# Patient Record
Sex: MALE | Race: WHITE | Employment: OTHER | URBAN - METROPOLITAN AREA
[De-identification: names, ages, dates, MRNs, and addresses within clinical notes are randomized per-mention and may not be internally consistent; named-entity substitution may affect disease eponyms.]

---

## 2018-07-14 ENCOUNTER — HOSPITAL ENCOUNTER (INPATIENT)
Facility: CLINIC | Age: 53
LOS: 1 days | Discharge: HOME | DRG: 871 | End: 2018-07-16
Attending: EMERGENCY MEDICINE | Admitting: HOSPITALIST
Payer: OTHER GOVERNMENT

## 2018-07-14 ENCOUNTER — APPOINTMENT (OUTPATIENT)
Dept: GENERAL RADIOLOGY | Facility: CLINIC | Age: 53
DRG: 871 | End: 2018-07-14
Attending: EMERGENCY MEDICINE
Payer: OTHER GOVERNMENT

## 2018-07-14 DIAGNOSIS — F17.200 TOBACCO DEPENDENCE SYNDROME: ICD-10-CM

## 2018-07-14 DIAGNOSIS — J18.9 PNEUMONIA OF RIGHT LOWER LOBE DUE TO INFECTIOUS ORGANISM: Primary | ICD-10-CM

## 2018-07-14 LAB
ALBUMIN SERPL-MCNC: 3.3 G/DL (ref 3.4–5)
ALP SERPL-CCNC: 103 U/L (ref 40–150)
ALT SERPL W P-5'-P-CCNC: 53 U/L (ref 0–70)
ANION GAP SERPL CALCULATED.3IONS-SCNC: 10 MMOL/L (ref 3–14)
AST SERPL W P-5'-P-CCNC: 30 U/L (ref 0–45)
BASOPHILS # BLD AUTO: 0.1 10E9/L (ref 0–0.2)
BASOPHILS NFR BLD AUTO: 0.5 %
BILIRUB SERPL-MCNC: 0.3 MG/DL (ref 0.2–1.3)
BUN SERPL-MCNC: 24 MG/DL (ref 7–30)
CALCIUM SERPL-MCNC: 8.9 MG/DL (ref 8.5–10.1)
CHLORIDE SERPL-SCNC: 103 MMOL/L (ref 94–109)
CO2 SERPL-SCNC: 26 MMOL/L (ref 20–32)
CREAT SERPL-MCNC: 1.19 MG/DL (ref 0.66–1.25)
DIFFERENTIAL METHOD BLD: ABNORMAL
EOSINOPHIL # BLD AUTO: 0.2 10E9/L (ref 0–0.7)
EOSINOPHIL NFR BLD AUTO: 1.7 %
ERYTHROCYTE [DISTWIDTH] IN BLOOD BY AUTOMATED COUNT: 14.9 % (ref 10–15)
GFR SERPL CREATININE-BSD FRML MDRD: 63 ML/MIN/1.7M2
GLUCOSE SERPL-MCNC: 122 MG/DL (ref 70–99)
HCT VFR BLD AUTO: 43.5 % (ref 40–53)
HGB BLD-MCNC: 14.9 G/DL (ref 13.3–17.7)
IMM GRANULOCYTES # BLD: 0.2 10E9/L (ref 0–0.4)
IMM GRANULOCYTES NFR BLD: 1.5 %
LACTATE BLD-SCNC: 1.7 MMOL/L (ref 0.7–2)
LYMPHOCYTES # BLD AUTO: 1.4 10E9/L (ref 0.8–5.3)
LYMPHOCYTES NFR BLD AUTO: 11.8 %
MCH RBC QN AUTO: 30.4 PG (ref 26.5–33)
MCHC RBC AUTO-ENTMCNC: 34.3 G/DL (ref 31.5–36.5)
MCV RBC AUTO: 89 FL (ref 78–100)
MONOCYTES # BLD AUTO: 1.4 10E9/L (ref 0–1.3)
MONOCYTES NFR BLD AUTO: 12.3 %
NEUTROPHILS # BLD AUTO: 8.4 10E9/L (ref 1.6–8.3)
NEUTROPHILS NFR BLD AUTO: 72.2 %
NRBC # BLD AUTO: 0 10*3/UL
NRBC BLD AUTO-RTO: 0 /100
PLATELET # BLD AUTO: 339 10E9/L (ref 150–450)
POTASSIUM SERPL-SCNC: 3.9 MMOL/L (ref 3.4–5.3)
PROCALCITONIN SERPL-MCNC: 0.15 NG/ML
PROT SERPL-MCNC: 7.7 G/DL (ref 6.8–8.8)
RBC # BLD AUTO: 4.9 10E12/L (ref 4.4–5.9)
SODIUM SERPL-SCNC: 139 MMOL/L (ref 133–144)
WBC # BLD AUTO: 11.6 10E9/L (ref 4–11)

## 2018-07-14 PROCEDURE — 87040 BLOOD CULTURE FOR BACTERIA: CPT | Performed by: EMERGENCY MEDICINE

## 2018-07-14 PROCEDURE — 87800 DETECT AGNT MULT DNA DIREC: CPT | Performed by: EMERGENCY MEDICINE

## 2018-07-14 PROCEDURE — 96365 THER/PROPH/DIAG IV INF INIT: CPT

## 2018-07-14 PROCEDURE — 87186 SC STD MICRODIL/AGAR DIL: CPT | Performed by: EMERGENCY MEDICINE

## 2018-07-14 PROCEDURE — 25000128 H RX IP 250 OP 636: Performed by: EMERGENCY MEDICINE

## 2018-07-14 PROCEDURE — 84145 PROCALCITONIN (PCT): CPT | Performed by: EMERGENCY MEDICINE

## 2018-07-14 PROCEDURE — 80053 COMPREHEN METABOLIC PANEL: CPT | Performed by: EMERGENCY MEDICINE

## 2018-07-14 PROCEDURE — 25000132 ZZH RX MED GY IP 250 OP 250 PS 637: Performed by: EMERGENCY MEDICINE

## 2018-07-14 PROCEDURE — 83605 ASSAY OF LACTIC ACID: CPT | Performed by: EMERGENCY MEDICINE

## 2018-07-14 PROCEDURE — 85025 COMPLETE CBC W/AUTO DIFF WBC: CPT | Performed by: EMERGENCY MEDICINE

## 2018-07-14 PROCEDURE — 96367 TX/PROPH/DG ADDL SEQ IV INF: CPT

## 2018-07-14 PROCEDURE — 87077 CULTURE AEROBIC IDENTIFY: CPT | Performed by: EMERGENCY MEDICINE

## 2018-07-14 PROCEDURE — 71046 X-RAY EXAM CHEST 2 VIEWS: CPT

## 2018-07-14 PROCEDURE — 99285 EMERGENCY DEPT VISIT HI MDM: CPT | Mod: 25

## 2018-07-14 RX ORDER — LISINOPRIL AND HYDROCHLOROTHIAZIDE 12.5; 2 MG/1; MG/1
1 TABLET ORAL DAILY
COMMUNITY

## 2018-07-14 RX ORDER — IBUPROFEN 400 MG/1
400 TABLET, FILM COATED ORAL ONCE
Status: COMPLETED | OUTPATIENT
Start: 2018-07-14 | End: 2018-07-14

## 2018-07-14 RX ORDER — CEFTRIAXONE 2 G/1
2 INJECTION, POWDER, FOR SOLUTION INTRAMUSCULAR; INTRAVENOUS ONCE
Status: COMPLETED | OUTPATIENT
Start: 2018-07-14 | End: 2018-07-14

## 2018-07-14 RX ADMIN — AZITHROMYCIN MONOHYDRATE 500 MG: 500 INJECTION, POWDER, LYOPHILIZED, FOR SOLUTION INTRAVENOUS at 23:21

## 2018-07-14 RX ADMIN — CEFTRIAXONE SODIUM 2 G: 2 INJECTION, POWDER, FOR SOLUTION INTRAMUSCULAR; INTRAVENOUS at 22:50

## 2018-07-14 RX ADMIN — IBUPROFEN 400 MG: 400 TABLET ORAL at 22:06

## 2018-07-14 ASSESSMENT — ENCOUNTER SYMPTOMS
ABDOMINAL PAIN: 0
FEVER: 1
HEADACHES: 1
COUGH: 1
DIAPHORESIS: 1

## 2018-07-14 NOTE — IP AVS SNAPSHOT
MRN:[id number]                      After Visit Summary   7/14/2018    Babak Akbar    MRN: [id number]           Thank you!     Thank you for choosing [facility] for your care. Our goal is always to provide you with excellent care. Hearing back from our patients is one way we can continue to improve our services. Please take a few minutes to complete the written survey that you may receive in the mail after you visit with us. Thank you!        Patient Information     Date Of Birth          1965        Designated Caregiver       Most Recent Value    Caregiver    Will someone help with your care after discharge? yes    Name of designated caregiver Marlen Akbar    Phone number of caregiver [phone]    Caregiver [address] [place] [address]       About your hospital stay     You were admitted on:  July 15, 2018 You last received care in the:  [facility] [address] [facility]    You were discharged on:  July 16, 2018        Reason for your hospital stay       You were admitted for pneumonia of your right lower lobe.                  Who to Call     For medical emergencies, please call 911.  For non-urgent questions about your medical care, please call your primary care provider or clinic, [phone]          Attending Provider     Provider Specialty    Rob Duval MD Emergency Medicine    [facility]       Primary Care Provider Office Phone # Fax #    [facility] [phone]       When to contact your care team       Call your primary doctor if you have any of the following:  increased shortness of breath, persistent fever >101, or develop persistent, watery diarrhea or new abdominal pain within 2 weeks of completing your course of antibiotics.                  After Care Instructions     Activity       Your activity upon discharge: activity as tolerated            Diet       Follow this  "diet upon discharge: Regular Diet Adult                  Follow-up Appointments     Follow-up and recommended labs and tests        Follow up with new primary care provider, Dr Arana, on Monday 7/23/18 at 11:30am for hospital follow- up and to initiate primary care.  Please arrive 15 minutes early.  No follow up labs or test are needed.                  Your next 10 appointments already scheduled     Jul 23, 2018 11:30 AM CDT   [facility] MD   [facility]    [address] [facility] [address] [address]   [phone]           Bring a current list of meds and any records pertaining to this visit. For Physicals, please bring immunization records and any forms needing to be filled out. Please arrive 10 minutes early to complete paperwork.              Pending Results     Date and Time Order Name Status Description    7/15/2018 0057 Sputum Culture Aerobic Bacterial In process     7/14/2018 2159 Blood culture Preliminary     7/14/2018 2159 Blood culture Preliminary             Statement of Approval     Ordered          07/16/18 1051  I have reviewed and agree with all the recommendations and orders detailed in this document.  EFFECTIVE NOW     Approved and electronically signed by:  Javier Castellanos MD             Admission Information     Date & Time Provider Department Dept. Phone    7/14/2018 Sean Bhatti,  [facility] [address] [facility] [phone]      Your Vitals Were     Blood Pressure Pulse Temperature Respirations Height Weight    146/101 (BP Location: Right arm) 78 97.5  F (36.4  C) (Oral) 18 1.88 m (6' 2\") 106.8 kg (235 lb 7.2 oz)    Pulse Oximetry BMI (Body Mass Index)                94% 30.23 kg/m2          MyChart Information     [employer] lets you send messages to your doctor, view your test results, renew your prescriptions, schedule appointments and more. To sign up, go to [email or web address] . Click " "on \"Log in\" on the left side of the screen, which will take you to the Welcome page. Then click on \"Sign up Now\" on the right side of the page.     You will be asked to enter the access code listed below, as well as some personal information. Please follow the directions to create your username and password.     Your access code is: [email or web address]  Expires: 10/14/2018 11:01 AM     Your access code will  in 90 days. If you need help or a new code, please call your [facility] clinic or [phone].        Care EveryWhere ID     This is your Care EveryWhere ID. This could be used by other organizations to access your [facility] medical records  [id number]        Equal Access to Services     REECE TORRE : Marianne Resendiz, waluther hasa, breanna kaalfuad palma, wes watts. So [facility] [phone].    ATENCIÓN: Si habla español, tiene a perez disposición servicios gratuitos de asistencia lingüística. Llame al [phone].    We comply with applicable federal civil rights laws and [place] laws. We do not discriminate on the basis of race, color, national origin, age, disability, sex, sexual orientation, or gender identity.               Review of your medicines      START taking        Dose / Directions    albuterol 108 (90 Base) MCG/ACT Inhaler   Commonly known as:  PROAIR HFA/PROVENTIL HFA/VENTOLIN HFA   Used for:  Pneumonia of right lower lobe due to infectious organism (H)        Dose:  2 puff   Inhale 2 puffs into the lungs every 6 hours as needed for shortness of breath / dyspnea or wheezing   Quantity:  1 Inhaler   Refills:  0       azithromycin 250 MG tablet   Commonly known as:  ZITHROMAX   Used for:  Pneumonia of right lower lobe due to infectious organism (H)        Dose:  250 mg   Start taking on:  2018   Take 1 tablet (250 mg) by mouth daily for 3 days   Quantity:  3 tablet   Refills:  0       cefuroxime 500 MG tablet   Commonly known as:  CEFTIN   Used " for:  Pneumonia of right lower lobe due to infectious organism (H)        Dose:  500 mg   Start taking on:  7/17/2018   Take 1 tablet (500 mg) by mouth 2 times daily for 5 days   Quantity:  10 tablet   Refills:  0       nicotine 14 MG/24HR 24 hr patch   Commonly known as:  NICODERM CQ   Used for:  Tobacco dependence syndrome        Dose:  1 patch   Place 1 patch onto the skin daily   Quantity:  21 patch   Refills:  0         CONTINUE these medicines which have NOT CHANGED        Dose / Directions    ATORVASTATIN CALCIUM PO        Dose:  40 mg   Take 40 mg by mouth   Refills:  0       lisinopril-hydrochlorothiazide 20-12.5 MG per tablet   Commonly known as:  PRINZIDE/ZESTORETIC        Dose:  1 tablet   Take 1 tablet by mouth daily   Refills:  0       TYLENOL PO        Dose:  500 mg   Take 500 mg by mouth   Refills:  0            Where to get your medicines      These medications were sent to [facility] - [address]  [address], [place] [address]     Phone:  [phone]     albuterol 108 (90 Base) MCG/ACT Inhaler    azithromycin 250 MG tablet    cefuroxime 500 MG tablet    nicotine 14 MG/24HR 24 hr patch                Protect others around you: Learn how to safely use, store and throw away your medicines at www.disposemymeds.org.        ANTIBIOTIC INSTRUCTION     You've Been Prescribed an Antibiotic - Now What?  Your healthcare team thinks that you or your loved one might have an infection. Some infections can be treated with antibiotics, which are powerful, life-saving drugs. Like all medications, antibiotics have side effects and should only be used when necessary. There are some important things you should know about your antibiotic treatment.      Your healthcare team may run tests before you start taking an antibiotic.    Your team may take samples (e.g., from your blood, urine or other areas) to run tests to look for bacteria. These test can be important to  determine if you need an antibiotic at all and, if you do, which antibiotic will work best.      Within a few days, your healthcare team might change or even stop your antibiotic.    Your team may start you on an antibiotic while they are working to find out what is making you sick.    Your team might change your antibiotic because test results show that a different antibiotic would be better to treat your infection.    In some cases, once your team has more information, they learn that you do not need an antibiotic at all. They may find out that you don't have an infection, or that the antibiotic you're taking won't work against your infection. For example, an infection caused by a virus can't be treated with antibiotics. Staying on an antibiotic when you don't need it is more likely to be harmful than helpful.      You may experience side effects from your antibiotic.    Like all medications, antibiotics have side effects. Some of these can be serious.    Let you healthcare team know if you have any known allergies when you are admitted to the hospital.    One significant side effect of nearly all antibiotics is the risk of severe and sometimes deadly diarrhea caused by Clostridium difficile (C. Difficile). This occurs when a person takes antibiotics because some good germs are destroyed. Antibiotic use allows C. diificile to take over, putting patients at high risk for this serious infection.    As a patient or caregiver, it is important to understand your or your loved one's antibiotic treatment. It is especially important for caregivers to speak up when patients can't speak for themselves. Here are some important questions to ask your healthcare team.    What infection is this antibiotic treating and how do you know I have that infection?    What side effects might occur from this antibiotic?    How long will I need to take this antibiotic?    Is it safe to take this antibiotic with other medications or  supplements (e.g., vitamins) that I am taking?     Are there any special directions I need to know about taking this antibiotic? For example, should I take it with food?    How will I be monitored to know whether my infection is responding to the antibiotic?    What tests may help to make sure the right antibiotic is prescribed for me?      Information provided by:  www.cdc.gov/getsmart  U.S. Department of Health and Human Services  Centers for disease Control and Prevention  National Center for Emerging and Zoonotic Infectious Diseases  Division of Healthcare Quality Promotion             Medication List: This is a list of all your medications and when to take them. Check marks below indicate your daily home schedule. Keep this list as a reference.      Medications           Morning Afternoon Evening Bedtime As Needed    albuterol 108 (90 Base) MCG/ACT Inhaler   Commonly known as:  PROAIR HFA/PROVENTIL HFA/VENTOLIN HFA   Inhale 2 puffs into the lungs every 6 hours as needed for shortness of breath / dyspnea or wheezing                                ATORVASTATIN CALCIUM PO   Take 40 mg by mouth   Last time this was given:  40 mg on 7/16/2018  7:54 AM                                azithromycin 250 MG tablet   Commonly known as:  ZITHROMAX   Take 1 tablet (250 mg) by mouth daily for 3 days   Start taking on:  7/17/2018                                cefuroxime 500 MG tablet   Commonly known as:  CEFTIN   Take 1 tablet (500 mg) by mouth 2 times daily for 5 days   Start taking on:  7/17/2018                                lisinopril-hydrochlorothiazide 20-12.5 MG per tablet   Commonly known as:  PRINZIDE/ZESTORETIC   Take 1 tablet by mouth daily   Last time this was given:  1 tablet on 7/16/2018  7:55 AM                                nicotine 14 MG/24HR 24 hr patch   Commonly known as:  NICODERM CQ   Place 1 patch onto the skin daily   Last time this was given:  1 patch on 7/16/2018  7:55 AM                                 TYLENOL PO   Take 500 mg by mouth

## 2018-07-14 NOTE — IP AVS SNAPSHOT
[facility] [address] [facility]    [address] [place] [address]    Phone:  [phone]                                       After Visit Summary   7/14/2018    Babak Akbar    MRN: [id number]           After Visit Summary Signature Page     I have received my discharge instructions, and my questions have been answered. I have discussed any challenges I see with this plan with the nurse or doctor.    ..........................................................................................................................................  Patient/Patient Representative Signature      ..........................................................................................................................................  Patient Representative Print Name and Relationship to Patient    ..................................................               ................................................  Date                                            Time    ..........................................................................................................................................  Reviewed by Signature/Title    ...................................................              ..............................................  Date                                                            Time

## 2018-07-15 PROBLEM — J18.9 COMMUNITY ACQUIRED PNEUMONIA: Status: ACTIVE | Noted: 2018-07-15

## 2018-07-15 LAB
GRAM STN SPEC: NORMAL
Lab: NORMAL
SPECIMEN SOURCE: NORMAL

## 2018-07-15 PROCEDURE — 87205 SMEAR GRAM STAIN: CPT | Performed by: HOSPITALIST

## 2018-07-15 PROCEDURE — 25000132 ZZH RX MED GY IP 250 OP 250 PS 637: Performed by: HOSPITALIST

## 2018-07-15 PROCEDURE — 99222 1ST HOSP IP/OBS MODERATE 55: CPT | Mod: AI | Performed by: HOSPITALIST

## 2018-07-15 PROCEDURE — 99207 ZZC APP CREDIT; MD BILLING SHARED VISIT: CPT | Performed by: HOSPITALIST

## 2018-07-15 PROCEDURE — 12000000 ZZH R&B MED SURG/OB

## 2018-07-15 PROCEDURE — 87070 CULTURE OTHR SPECIMN AEROBIC: CPT | Performed by: HOSPITALIST

## 2018-07-15 PROCEDURE — 25000128 H RX IP 250 OP 636: Performed by: HOSPITALIST

## 2018-07-15 RX ORDER — ATORVASTATIN CALCIUM 40 MG/1
40 TABLET, FILM COATED ORAL DAILY
Status: DISCONTINUED | OUTPATIENT
Start: 2018-07-16 | End: 2018-07-16 | Stop reason: HOSPADM

## 2018-07-15 RX ORDER — CEFTRIAXONE 1 G/1
1 INJECTION, POWDER, FOR SOLUTION INTRAMUSCULAR; INTRAVENOUS EVERY 24 HOURS
Status: DISCONTINUED | OUTPATIENT
Start: 2018-07-16 | End: 2018-07-15

## 2018-07-15 RX ORDER — ACETAMINOPHEN 500 MG
500 TABLET ORAL EVERY 6 HOURS PRN
Status: DISCONTINUED | OUTPATIENT
Start: 2018-07-15 | End: 2018-07-16 | Stop reason: HOSPADM

## 2018-07-15 RX ORDER — HYDRALAZINE HYDROCHLORIDE 20 MG/ML
10 INJECTION INTRAMUSCULAR; INTRAVENOUS EVERY 4 HOURS PRN
Status: DISCONTINUED | OUTPATIENT
Start: 2018-07-15 | End: 2018-07-16 | Stop reason: HOSPADM

## 2018-07-15 RX ORDER — CEFTRIAXONE 1 G/1
1 INJECTION, POWDER, FOR SOLUTION INTRAMUSCULAR; INTRAVENOUS EVERY 24 HOURS
Status: DISCONTINUED | OUTPATIENT
Start: 2018-07-15 | End: 2018-07-16

## 2018-07-15 RX ORDER — NALOXONE HYDROCHLORIDE 0.4 MG/ML
.1-.4 INJECTION, SOLUTION INTRAMUSCULAR; INTRAVENOUS; SUBCUTANEOUS
Status: DISCONTINUED | OUTPATIENT
Start: 2018-07-15 | End: 2018-07-16 | Stop reason: HOSPADM

## 2018-07-15 RX ORDER — LISINOPRIL AND HYDROCHLOROTHIAZIDE 12.5; 2 MG/1; MG/1
1 TABLET ORAL DAILY
Status: DISCONTINUED | OUTPATIENT
Start: 2018-07-16 | End: 2018-07-16 | Stop reason: HOSPADM

## 2018-07-15 RX ORDER — SODIUM CHLORIDE 9 MG/ML
INJECTION, SOLUTION INTRAVENOUS CONTINUOUS
Status: DISCONTINUED | OUTPATIENT
Start: 2018-07-15 | End: 2018-07-15

## 2018-07-15 RX ORDER — NICOTINE 21 MG/24HR
1 PATCH, TRANSDERMAL 24 HOURS TRANSDERMAL DAILY
Status: DISCONTINUED | OUTPATIENT
Start: 2018-07-15 | End: 2018-07-16 | Stop reason: HOSPADM

## 2018-07-15 RX ADMIN — NICOTINE 1 PATCH: 14 PATCH, EXTENDED RELEASE TRANSDERMAL at 17:09

## 2018-07-15 RX ADMIN — NICOTINE 1 PATCH: 14 PATCH, EXTENDED RELEASE TRANSDERMAL at 08:32

## 2018-07-15 RX ADMIN — SODIUM CHLORIDE: 9 INJECTION, SOLUTION INTRAVENOUS at 01:19

## 2018-07-15 NOTE — PLAN OF CARE
Problem: Patient Care Overview  Goal: Plan of Care/Patient Progress Review  Outcome: No Change  Admission    Patient arrives to room 632-1 via cart from the ED.    A/Ox4, VSS ex HTN, on 2L NCO2, denies pain, SOB.  Pt states that he did not take PTA HTN meds today.  Unable to wean, goal is >94% sats.  Independent in room, calls appropriately.  IVF infusing.      Inpatient nursing criteria listed below were met:    PCD's Documented: Yes  Skin issues/needs documented :NA  Isolation education started/completed NA  Patient allergies verified with patient: Yes  Verified completion of [place] Risk Assessment Tool:  No  Verified completion of Guardianship screening tool: No  Fall Prevention: Care plan updated, Education given and documented Yes  Care Plan initiated: Yes  Home medications documented in belongings flowsheet: NA  Patient belongings documented in belongings flowsheet: Yes  Reminder note (belongings/ medications) placed in discharge instructions:NA  Admission profile/ required documentation complete: No, needs review of advanced directives.  Will report to oncoming RN.  Patient had asked to be left sleeping at last interaction.        Problem: Pneumonia (Adult)  Goal: Signs and Symptoms of Listed Potential Problems Will be Absent, Minimized or Managed (Pneumonia)  Signs and symptoms of listed potential problems will be absent, minimized or managed by discharge/transition of care (reference Pneumonia (Adult) CPG).   Outcome: No Change  LS are fine to coarse crackles throughout.  CXR shows right-sided infiltrates.  D/C 2-3 days pending clinical course and IV abx.  IS Given.  Awaiting sputum collection, container given.  Non-productive, infrequent cough overnight.      Problem: Cardiac Disease Comorbidity  Goal: Cardiac Disease  Patient comorbidity will be monitored for signs and symptoms of Cardiac Disease.  Problems will be absent, minimized or managed by discharge/transition of care.   Outcome: No Change  HTN:  PTA meds for management.    Problem: Peripheral Vascular/Peripheral Neurovascular Disease Comorbidity  Goal: Peripheral Vascular/Peripheral Neurovascular Disease  Patient comorbidity will be monitored for signs and symptoms of Peripheral Vascular/Peripheral Neurovascular Disease condition.  Problems will be absent, minimized or managed by discharge/transition of care.   Outcome: No Change  Frequent ambulation. Independent in room/halls.

## 2018-07-15 NOTE — PLAN OF CARE
Problem: Patient Care Overview  Goal: Plan of Care/Patient Progress Review  Outcome: Improving  Patient alert/orient X4, up independently in room, ambulating hallways throughout shift.  Lungs clear, on RA 92%, does get slight CASIANO.  Instructed patient on IS, tolerating regular diet/good fluid intake.  Temps 99.4/99.1, offered tylenol but pt refused, diastolic pressure slightly elevated.  Nicotine patch on left deltoid. Continue to monitor.

## 2018-07-15 NOTE — PROGRESS NOTES
RN [id number]:  Shira, temp 98.3.  Up ambulating in hallway independently.  Lungs clear, on RA, denies any pain.

## 2018-07-15 NOTE — ED PROVIDER NOTES
"  History     Chief Complaint:  Fever     HPI   Babak Akbar is a 53 year old male with a history of hypertension who presents to the emergency department today for evaluation of fever and cough. The patient reports he was station for 9 months in [place] and spend most of his time in [facility]. For two weeks prior to arrival back to [place], he was in the [facility] performing  exercises. He flew back to [place], [place] and returned to [place] today. 9 days ago, he began to have a fever. He was tested negative for strep. 5-4 days ago, he began to developed a productive cough. His fever had been improved but returned today, prompting him to present to the emergency department. He took tylenol around 2000 without significant improvement. He denies coughing blood, abdominal pain.     Allergies:  No Known Drug Allergies    Medications:    Lisinopril-Hydrochlorothiazide     Past Medical History:    Hypertension    Past Surgical History:    History reviewed. No pertinent surgical history.    Family History:    History reviewed. No pertinent family history.     Social History:  The patient was accompanied to the ED by his wife.  Smoking Status: current every day smoker  Smokeless Tobacco: never  Alcohol Use: no  Marital Status:      Review of Systems   Constitutional: Positive for diaphoresis and fever.   Respiratory: Positive for cough.    Gastrointestinal: Negative for abdominal pain.   Neurological: Positive for headaches.   All other systems reviewed and are negative.    Physical Exam   First Vitals: BP (!) 156/96  Temp 101.5  F (38.6  C) (Oral)  Resp 24  Ht 1.88 m (6' 2\")  Wt 104.3 kg (230 lb)  SpO2 90%  BMI 29.53 kg/m2    Physical Exam  GENERAL: well developed, pleasant  HEAD: atraumatic  EYES: pupils reactive, extraocular muscles intact, conjunctivae normal  ENT:  mucus membranes moist  NECK:  trachea midline, normal range of motion  RESPIRATORY:occasional coarse breath " sounds in the right base, no tachypneaCVS: normal S1/S2, no murmurs, intact distal pulses  ABDOMEN: soft, nontender, nondistention  MUSCULOSKELETAL: no deformities  SKIN: warm and dry, no acute rashes or ulceration  NEURO: GCS 15, cranial nerves intact, alert and oriented x3  PSYCH:  Mood/affect normal    Emergency Department Course     Imaging:  Radiology findings were communicated with the patient who voiced understanding of the findings.    XR Chest 2 Views   Final Result   IMPRESSION: Right lower lobe infiltrate concerning for pneumonia.   Small right pleural effusion. There may also be a small left   infiltrate. No pneumothorax visualized. Cardiac silhouette within   normal limits.      ARIADNA SIEGEL MD     Laboratory:  Laboratory findings were communicated with the patient who voiced understanding of the findings.    Blood Culture: Pending  CBC: WBC 11.6 (H), HGB 14.9,   CMP: Creatinine 1.19, glucose 122 (H), albumin 3.3 (L)  Procalcitonin: 0.15  Lactic Acid: 1.7    Interventions:  2206: Ibuprofen: 400 mg PO  2250: Rocephin 2 g IV  2321: Zithromax 500 mg IV     Emergency Department Course:  Nursing notes and vitals reviewed.  I performed an exam of the patient as documented above.   IV was inserted and blood was drawn for laboratory testing, results above.  The patient was sent for a XR Chest 2 Views while in the emergency department, results above.     I personally reviewed the laboratory and imaging results with the Patient and answered all related questions prior to admission. Findings and plan explained to the Patient who consents to admission. Discussed the patient with Dr. Bhatti,, who will admit the patient  for further monitoring, evaluation, and treatment.    Impression & Plan      Medical Decision Making:  Babak Akbar is a 53 year old male who present with fever and cough. Patient has occasional coarse breath sounds in the right base. Patient is requiring O2 and is very  diaphoretic with a fever. Blood cultures and chest XR and labs look consistent with pneumonia. I spoke to the hospitalist regarding admission.     Diagnosis:      1. Pneumonia of right lower lobe due to infectious organism (H)     Disposition:  Admitted to hospitalist service.     Scribe Disclosure:  I, Sean Machado, am serving as a scribe at 9:47 PM on 7/14/2018 to document services personally performed by Rob Duval MD based on my observations and the provider's statements to me.     7/14/2018    EMERGENCY DEPARTMENT       Rob Duval MD  07/15/18 0116

## 2018-07-15 NOTE — H&P
[facility]    History and Physical  Hospitalist       Date of Admission:  7/14/2018    Assessment & Plan   Babak Akbar is a 53 year old male who presents with shortness of breath, fever, and cough suspicious for community-acquired pneumonia    Community-acquired pneumonia with possible early sepsis:  Probably post viral given his by bimodal symptomatology  Early sepsis possible given his fever and leukocytosis of 11.6, but procalcitonin was negative  He desaturated to 89% on room air necessitating oxygen therapy  No history to suggest atypical or mycobacterial infection  Plan:  -Admitted to inpatient for IV ceftriaxone and azithromycin  -Continue oxygen therapy to keep O2 sats greater than 94  -Follow the sputum cultures, blood cultures    Hypertension  Plan:  -Continue the home Zestoretic  -Will continue the IV fluids    Hyperlipidemia  Plan:  -Continue his 40 mg of atorvastatin    DVT Prophylaxis: Low Risk/Ambulatory with no VTE prophylaxis indicated  Code Status: Full Code    Disposition: Expected discharge in 2-3 days once improved.    Sean Bhatti, DO    Primary Care Physician   Physician No Ref-Primary    Chief Complaint   fever    History is obtained from the patient    History of Present Illness   Babak Akbar is a 53 year old male who presents with fever, productive cough, and generalized malaise.  The patient is in the [employer] and was just stationed hungry returning on June 23 to [facility] for another 2 weeks of duty.  He developed a fever initially on July 6 along with generalized body aches.  The [facility], where he was diagnosed with a viral infection and told to take Tylenol, but return should his symptoms worsen or his fever to become higher than 102 F.  The patient actually noticed improvement of his symptoms throughout last week until he developed another fever on Friday, at this time  accompanied by shortness of breath, sweating, and productive cough creating tan colored sputum.  He denies any unexplained weight loss or night sweats prior to the last few days.  He has not had any weight loss.  All he is tried was Tylenol for the fever, which was marginally effective.    Past Medical History    I have reviewed this patient's medical history and updated it with pertinent information if needed.   Past Medical History:   Diagnosis Date     Hypertension      Tobacco abuse        Past Surgical History   I have reviewed this patient's surgical history and updated it with pertinent information if needed.  Past Surgical History:   Procedure Laterality Date     XR KNEE ARTHROGRAM LEFT         Prior to Admission Medications   Prior to Admission Medications   Prescriptions Last Dose Informant Patient Reported? Taking?   ATORVASTATIN CALCIUM PO Past Week at Unknown time  Yes Yes   Sig: Take 40 mg by mouth   Acetaminophen (TYLENOL PO) 7/14/2018 at 2030  Yes Yes   Sig: Take 500 mg by mouth   lisinopril-hydrochlorothiazide (PRINZIDE/ZESTORETIC) 20-12.5 MG per tablet Past Week at Unknown time  Yes Yes   Sig: Take 1 tablet by mouth daily      Facility-Administered Medications: None     Allergies   No Known Allergies    Social History   I have reviewed this patient's social history and updated it with pertinent information if needed. Babak SALINAS Raffy  reports that he has been smoking.  He has never used smokeless tobacco. He reports that he does not drink alcohol or use illicit drugs.    Family History   I have reviewed this patient's family history and updated it with pertinent information if needed.   Family History   Problem Relation Age of Onset     Adopted: Yes       Review of Systems   The 10 point Review of Systems is negative other than noted in the HPI or here.     Physical Exam   Temp: 100.2  F (37.9  C) Temp src: Oral BP: (!) 151/94   Heart Rate: 85 Resp: 18 SpO2: 93 % O2 Device: Nasal cannula  Oxygen Delivery: 2 LPM  Vital Signs with Ranges  Temp:  [100.2  F (37.9  C)-101.5  F (38.6  C)] 100.2  F (37.9  C)  Heart Rate:  [] 85  Resp:  [18-24] 18  BP: (151-179)/(94-99) 151/94  SpO2:  [90 %-93 %] 93 %  230 lbs 0 oz    Constitutional: Awake, alert, cooperative, mild distress diaphoresis,.  Eyes: Conjunctiva and pupils examined and normal.  HEENT: Moist mucous membranes, normal dentition.  Respiratory: Rhonchi present in the right lower lobe.  Work of breathing normal.  Cardiovascular: Regular rate and rhythm, normal S1 and S2, and no murmur noted.  GI: Soft, non-distended, non-tender, normal bowel sounds.  Lymph/Hematologic: No anterior cervical or supraclavicular adenopathy.  Skin: No rashes, no cyanosis, no edema.  Musculoskeletal: No joint swelling, erythema or tenderness.  Neurologic: Cranial nerves 2-12 intact, normal strength and sensation.  Psychiatric: Alert, oriented to person, place and time, no obvious anxiety or depression.    Data   Data reviewed today:  I personally reviewed the chest x-ray image(s) showing Right lower lobe pneumonia with tiny right pleural effusion.    Recent Labs  Lab 07/14/18  2200   WBC 11.6*   HGB 14.9   MCV 89         POTASSIUM 3.9   CHLORIDE 103   CO2 26   BUN 24   CR 1.19   ANIONGAP 10   MITZI 8.9   *   ALBUMIN 3.3*   PROTTOTAL 7.7   BILITOTAL 0.3   ALKPHOS 103   ALT 53   AST 30       Imaging:  Recent Results (from the past 24 hour(s))   XR Chest 2 Views    Narrative    CHEST TWO VIEW   7/14/2018 10:14 PM     HISTORY: Shortness of breath.     COMPARISON: None.      Impression    IMPRESSION: Right lower lobe infiltrate concerning for pneumonia.  Small right pleural effusion. There may also be a small left  infiltrate. No pneumothorax visualized. Cardiac silhouette within  normal limits.    ARIADNA SIEGEL MD

## 2018-07-15 NOTE — ED NOTES
[facility]  ED Nurse Handoff Report    ED Chief complaint: Fever (For last week.  As high as 103.5.  It went a way for a couple days and returned tonight.  Was in [place] for 9 months, went to [place] and came back to [place] Monday night.  101.4 in triage--last tylenol was 20 minutes ago.  Excessive sweating in triage.  ) and Cough      ED Diagnosis:   Final diagnoses:   Pneumonia of right lower lobe due to infectious organism (H)       Code Status: Full Code    Allergies: No Known Allergies    Activity level - Baseline/Home:  Independent    Activity Level - Current:   Independent     Needed?: No    Isolation: No  Infection: Not Applicable  Bariatric?: No    Vital Signs:   Vitals:    07/14/18 2200 07/14/18 2207 07/14/18 2253 07/14/18 2300   BP: (!) 156/96   (!) 158/99   Resp:  20  20   Temp:   100.2  F (37.9  C)    TempSrc:   Oral    SpO2: 90% 92%  92%   Weight:       Height:           Cardiac Rhythm: ,        Pain level: 0-10 Pain Scale: 3    Is this patient confused?: No   [place]   Suicide Severity Rating Scale Completed?  Yes  If yes, what color did the patient score?  White    Patient Report: Initial Complaint: Patient  presents to the emergency department today for evaluation of fever and cough. The patient reports he was station for 9 months in [place] [facility] and spend most of his time in [facility]. 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 He flew back to [place], [place] and returned to [place] today. 9 days ago, he began to have a fever. He was tested negative for strep. 5-4 days ago, he began to developed a productive cough. His fever had been improved but returned today, prompting him to present to the emergency department. He denies coughing blood, abdominal pain.   Focused Assessment: Cards: WDL  Resp: Shallow breaths, denies SOB. Low Spo2, on 2LNC pneumonia diagnosis.  [place]: WDL  Skin: Warm, fevers  Patient  feeling mostly ok, but some mild generalized weakness reported.    Tests Performed: CXR, BC, and lab work  Abnormal Results:   Results for orders placed or performed during the hospital encounter of 07/14/18   XR Chest 2 Views    Narrative    CHEST TWO VIEW   7/14/2018 10:14 PM     HISTORY: Shortness of breath.     COMPARISON: None.      Impression    IMPRESSION: Right lower lobe infiltrate concerning for pneumonia.  Small right pleural effusion. There may also be a small left  infiltrate. No pneumothorax visualized. Cardiac silhouette within  normal limits.    ARIADNA SIEGEL MD   CBC with platelets differential   Result Value Ref Range    WBC 11.6 (H) 4.0 - 11.0 10e9/L    RBC Count 4.90 4.4 - 5.9 10e12/L    Hemoglobin 14.9 13.3 - 17.7 g/dL    Hematocrit 43.5 40.0 - 53.0 %    MCV 89 78 - 100 fl    MCH 30.4 26.5 - 33.0 pg    MCHC 34.3 31.5 - 36.5 g/dL    RDW 14.9 10.0 - 15.0 %    Platelet Count 339 150 - 450 10e9/L    Diff Method Automated Method     % Neutrophils 72.2 %    % Lymphocytes 11.8 %    % Monocytes 12.3 %    % Eosinophils 1.7 %    % Basophils 0.5 %    % Immature Granulocytes 1.5 %    Nucleated RBCs 0 0 /100    Absolute Neutrophil 8.4 (H) 1.6 - 8.3 10e9/L    Absolute Lymphocytes 1.4 0.8 - 5.3 10e9/L    Absolute Monocytes 1.4 (H) 0.0 - 1.3 10e9/L    Absolute Eosinophils 0.2 0.0 - 0.7 10e9/L    Absolute Basophils 0.1 0.0 - 0.2 10e9/L    Abs Immature Granulocytes 0.2 0 - 0.4 10e9/L    Absolute Nucleated RBC 0.0    Comprehensive metabolic panel   Result Value Ref Range    Sodium 139 133 - 144 mmol/L    Potassium 3.9 3.4 - 5.3 mmol/L    Chloride 103 94 - 109 mmol/L    Carbon Dioxide 26 20 - 32 mmol/L    Anion Gap 10 3 - 14 mmol/L    Glucose 122 (H) 70 - 99 mg/dL    Urea Nitrogen 24 7 - 30 mg/dL    Creatinine 1.19 0.66 - 1.25 mg/dL    GFR Estimate 63 >60 mL/min/1.7m2    GFR Estimate If Black 77 >60 mL/min/1.7m2    Calcium 8.9 8.5 - 10.1 mg/dL    Bilirubin Total 0.3 0.2 - 1.3 mg/dL    Albumin 3.3 (L) 3.4 - 5.0 g/dL     Protein Total 7.7 6.8 - 8.8 g/dL    Alkaline Phosphatase 103 40 - 150 U/L    ALT 53 0 - 70 U/L    AST 30 0 - 45 U/L   Procalcitonin   Result Value Ref Range    Procalcitonin 0.15 ng/ml   Lactic acid whole blood   Result Value Ref Range    Lactic Acid 1.7 0.7 - 2.0 mmol/L     Treatments provided: PO motrin and IV antibiotics    Family Comments: Spouse at bedside    OBS brochure/video discussed/provided to patient: N/A    ED Medications:   Medications   azithromycin (ZITHROMAX) 500 mg in sodium chloride 0.9 % 250 mL intermittent infusion (500 mg Intravenous New Bag 7/14/18 3382)   ibuprofen (ADVIL/MOTRIN) tablet 400 mg (400 mg Oral Given 7/14/18 2206)   cefTRIAXone (ROCEPHIN) 2 g vial to attach to  ml bag for ADULTS or NS 50 ml bag for PEDS (2 g Intravenous New Bag 7/14/18 1150)       Drips infusing?:  No    For the majority of the shift this patient was [place].   Interventions performed were na.    Severe Sepsis OR Septic Shock Diagnosis Present: No      ED NURSE PHONE NUMBER: [phone]

## 2018-07-15 NOTE — PROGRESS NOTES
RECEIVING UNIT ED HANDOFF REVIEW    ED Nurse Handoff Report was reviewed by: Kevin Rodgers on July 15, 2018 at 12:07 AM

## 2018-07-16 VITALS
HEART RATE: 78 BPM | RESPIRATION RATE: 18 BRPM | BODY MASS INDEX: 30.22 KG/M2 | SYSTOLIC BLOOD PRESSURE: 146 MMHG | WEIGHT: 235.45 LBS | DIASTOLIC BLOOD PRESSURE: 101 MMHG | HEIGHT: 74 IN | TEMPERATURE: 97.5 F | OXYGEN SATURATION: 94 %

## 2018-07-16 LAB
ANION GAP SERPL CALCULATED.3IONS-SCNC: 8 MMOL/L (ref 3–14)
BASOPHILS # BLD AUTO: 0.2 10E9/L (ref 0–0.2)
BASOPHILS NFR BLD AUTO: 3 %
BUN SERPL-MCNC: 20 MG/DL (ref 7–30)
CALCIUM SERPL-MCNC: 8.9 MG/DL (ref 8.5–10.1)
CHLORIDE SERPL-SCNC: 106 MMOL/L (ref 94–109)
CO2 SERPL-SCNC: 26 MMOL/L (ref 20–32)
CREAT SERPL-MCNC: 1.02 MG/DL (ref 0.66–1.25)
DIFFERENTIAL METHOD BLD: ABNORMAL
EOSINOPHIL # BLD AUTO: 0.4 10E9/L (ref 0–0.7)
EOSINOPHIL NFR BLD AUTO: 5 %
ERYTHROCYTE [DISTWIDTH] IN BLOOD BY AUTOMATED COUNT: 15.1 % (ref 10–15)
GFR SERPL CREATININE-BSD FRML MDRD: 76 ML/MIN/1.7M2
GLUCOSE SERPL-MCNC: 101 MG/DL (ref 70–99)
HCT VFR BLD AUTO: 43.9 % (ref 40–53)
HGB BLD-MCNC: 14.7 G/DL (ref 13.3–17.7)
LYMPHOCYTES # BLD AUTO: 1.9 10E9/L (ref 0.8–5.3)
LYMPHOCYTES NFR BLD AUTO: 25 %
MCH RBC QN AUTO: 30 PG (ref 26.5–33)
MCHC RBC AUTO-ENTMCNC: 33.5 G/DL (ref 31.5–36.5)
MCV RBC AUTO: 90 FL (ref 78–100)
METAMYELOCYTES # BLD: 0.1 10E9/L
METAMYELOCYTES NFR BLD MANUAL: 1 %
MONOCYTES # BLD AUTO: 0.5 10E9/L (ref 0–1.3)
MONOCYTES NFR BLD AUTO: 7 %
MYELOCYTES # BLD: 0.1 10E9/L
MYELOCYTES NFR BLD MANUAL: 1 %
NEUTROPHILS # BLD AUTO: 4.5 10E9/L (ref 1.6–8.3)
NEUTROPHILS NFR BLD AUTO: 58 %
PLATELET # BLD AUTO: 381 10E9/L (ref 150–450)
PLATELET # BLD EST: ABNORMAL 10*3/UL
POTASSIUM SERPL-SCNC: 4.2 MMOL/L (ref 3.4–5.3)
RBC # BLD AUTO: 4.9 10E12/L (ref 4.4–5.9)
RBC MORPH BLD: ABNORMAL
SODIUM SERPL-SCNC: 140 MMOL/L (ref 133–144)
WBC # BLD AUTO: 7.7 10E9/L (ref 4–11)

## 2018-07-16 PROCEDURE — 36415 COLL VENOUS BLD VENIPUNCTURE: CPT | Performed by: HOSPITALIST

## 2018-07-16 PROCEDURE — 80048 BASIC METABOLIC PNL TOTAL CA: CPT | Performed by: HOSPITALIST

## 2018-07-16 PROCEDURE — 25000128 H RX IP 250 OP 636: Performed by: HOSPITALIST

## 2018-07-16 PROCEDURE — 25000132 ZZH RX MED GY IP 250 OP 250 PS 637: Performed by: HOSPITALIST

## 2018-07-16 PROCEDURE — 85025 COMPLETE CBC W/AUTO DIFF WBC: CPT | Performed by: HOSPITALIST

## 2018-07-16 PROCEDURE — 99238 HOSP IP/OBS DSCHRG MGMT 30/<: CPT | Performed by: HOSPITALIST

## 2018-07-16 RX ORDER — CEFTRIAXONE 1 G/1
1 INJECTION, POWDER, FOR SOLUTION INTRAMUSCULAR; INTRAVENOUS EVERY 24 HOURS
Status: DISCONTINUED | OUTPATIENT
Start: 2018-07-16 | End: 2018-07-16 | Stop reason: HOSPADM

## 2018-07-16 RX ORDER — ALBUTEROL SULFATE 90 UG/1
2 AEROSOL, METERED RESPIRATORY (INHALATION) EVERY 6 HOURS PRN
Qty: 1 INHALER | Refills: 0 | Status: SHIPPED | OUTPATIENT
Start: 2018-07-16 | End: 2018-07-23

## 2018-07-16 RX ORDER — CEFUROXIME AXETIL 500 MG/1
500 TABLET ORAL 2 TIMES DAILY
Qty: 10 TABLET | Refills: 0 | Status: SHIPPED | OUTPATIENT
Start: 2018-07-17 | End: 2018-07-22

## 2018-07-16 RX ORDER — NICOTINE 21 MG/24HR
1 PATCH, TRANSDERMAL 24 HOURS TRANSDERMAL DAILY
Qty: 21 PATCH | Refills: 0 | Status: SHIPPED | OUTPATIENT
Start: 2018-07-16 | End: 2018-09-14

## 2018-07-16 RX ORDER — CEFAZOLIN SODIUM 1 G/50ML
1250 SOLUTION INTRAVENOUS EVERY 8 HOURS
Status: DISCONTINUED | OUTPATIENT
Start: 2018-07-16 | End: 2018-07-16

## 2018-07-16 RX ORDER — AZITHROMYCIN 250 MG/1
250 TABLET, FILM COATED ORAL DAILY
Qty: 3 TABLET | Refills: 0 | Status: SHIPPED | OUTPATIENT
Start: 2018-07-17 | End: 2018-07-20

## 2018-07-16 RX ADMIN — LISINOPRIL AND HYDROCHLOROTHIAZIDE 1 TABLET: 12.5; 2 TABLET ORAL at 07:55

## 2018-07-16 RX ADMIN — VANCOMYCIN HYDROCHLORIDE 1250 MG: 5 INJECTION, POWDER, LYOPHILIZED, FOR SOLUTION INTRAVENOUS at 04:18

## 2018-07-16 RX ADMIN — CEFTRIAXONE 1 G: 1 INJECTION, POWDER, FOR SOLUTION INTRAMUSCULAR; INTRAVENOUS at 10:24

## 2018-07-16 RX ADMIN — ATORVASTATIN CALCIUM 40 MG: 40 TABLET, FILM COATED ORAL at 07:54

## 2018-07-16 RX ADMIN — NICOTINE 1 PATCH: 14 PATCH, EXTENDED RELEASE TRANSDERMAL at 07:55

## 2018-07-16 RX ADMIN — AZITHROMYCIN MONOHYDRATE 500 MG: 500 INJECTION, POWDER, LYOPHILIZED, FOR SOLUTION INTRAVENOUS at 10:57

## 2018-07-16 NOTE — PROGRESS NOTES
Discharge    Patient discharged to home  with wife  Listed belongings gathered and returned to patient. Yes  Care Plan and Patient education resolved: Yes  Prescriptions if needed, hard copies sent with patient Yes  Home and hospital acquired medications returned to patient: NA  Medication Bin checked and emptied on discharge Yes  Follow up appointment made for patient: Yes

## 2018-07-16 NOTE — PHARMACY-VANCOMYCIN DOSING SERVICE
Pharmacy Vancomycin Initial Note  Date of Service 2018  Patient's  1965  53 year old, male    Indication: Bacteremia    Current estimated CrCl = Estimated Creatinine Clearance: 91.3 mL/min (based on Cr of 1.19).    Creatinine for last 3 days  2018: 10:00 PM Creatinine 1.19 mg/dL    Recent Vancomycin Level(s) for last 3 days  No results found for requested labs within last 72 hours.      Vancomycin IV Administrations (past 72 hours)      No vancomycin orders with administrations in past 72 hours.                Nephrotoxins and other renal medications (Future)    Start     Dose/Rate Route Frequency Ordered Stop    18 0900  lisinopril-hydrochlorothiazide (PRINZIDE/ZESTORETIC) 20-12.5 MG per tablet 1 tablet      1 tablet Oral DAILY 07/15/18 0057      18 0400  vancomycin (VANCOCIN) 1,250 mg in sodium chloride 0.9 % 250 mL intermittent infusion      1,250 mg  over 90 Minutes Intravenous EVERY 8 HOURS 18 0359            Contrast Orders - past 72 hours     None                Plan:  1.  Start vancomycin  1250- mg IV q8h.   2.  Goal Trough Level: 15-20 mg/L   3.  Pharmacy will check trough levels as appropriate in 1-3 Days.    4. Serum creatinine levels will be ordered daily for the first week of therapy and at least twice weekly for subsequent weeks.    5. Kendall method utilized to dose vancomycin therapy: Method 1    Yunier Thrasher

## 2018-07-16 NOTE — PLAN OF CARE
Problem: Patient Care Overview  Goal: Plan of Care/Patient Progress Review  Outcome: Improving  [id number]: PT A&O X4,  A fibril, on RA denies pain at this time. PT up independently in room, ambulating hallways X1  PT has non productive cough encourage to use IS, tolerating regular diet voiding. Pt blood pressure slightly elevated contacted the on call MD got order PRN hydralazine . Plan to ontinue to monitor.

## 2018-07-16 NOTE — DISCHARGE SUMMARY
[facility]    Discharge Summary  Hospitalist    Date of Admission:  7/14/2018  Date of Discharge:  7/16/2018  Discharging Provider: Javier Castellanos  Date of Service (when I saw the patient): 07/16/18    Discharge Diagnoses   Community acquired pneumonia of right lower lobe with early sepsis  Tobacco dependence  HTN  Hyperlipidemia    History of Present Illness   Babak Akbar is an 53 year old male who presented with shortness of breath.    Hospital Course   Babak Akbar was admitted on 7/14/2018.  The following problems were addressed during his hospitalization:    Community-acquired pneumonia of right lower lobe with possible early sepsis:  Presented with fever, tachycardia and leukocytosis of 11.6 with CXR demonstrating right lower lobe infiltrates.  Signs of sepsis rapidly resolved with IVF and antibiotics.  Sputum gram stain positive for few GPC and few gram positive rods.  1/2 admission blood cultures positive for GPC in clusters with negative PCR for Staph, Strep, etc., and therefore suspect contaminant.  He reported no significant change in dyspnea or cough following admission but was stable on room air and felt well for discharge home.  Initially treated with ceftriaxone and azithro, will complete azithro course and transition Ceftin at discharge with follow up with PCP.      Tobacco dependence  He will discharge home with nicotine patch.      Hypertension  Continue PTA Zestoretic      Hyperlipidemia  Continue PTA atorvastatin    # Discharge Pain Plan:   - Patient currently has NO PAIN and is not being prescribed pain medications on discharge.        Javier Castellanos    Significant Results and Procedures   See imaging below    Pending Results   These results will be followed up by: Hospitalist service  Unresulted Labs Ordered in the Past 30 Days of this Admission     Date and Time Order Name Status Description    7/16/2018 0000 CBC with platelets differential In process      7/15/2018 0057 Sputum Culture Aerobic Bacterial In process     7/14/2018 2159 Blood culture Preliminary     7/14/2018 2159 Blood culture Preliminary           Code Status   Full Code       Primary Care Physician   Physician No Ref-Primary    Constitutional: well developed, well nourished male in no acute distress  Respiratory: diminished right basilar lung sounds with right middle crackles, few left basilar crackles, very mild wheezing  Cardiovascular: regular rate and rhythm, normal S1/S2, no murmur, rubs or gallops  GI: abdomen soft, non-tender, non-distended, normal bowel sounds  Lymph/Hematologic: No peripheral edema  Musculoskeletal: Extremities warm and well perfused  Neurologic: alert and appropriate, cranial nerves grossly intact, gross motor movements intact  Psychiatric: normal affect    Discharge Disposition   Discharged to home  Condition at discharge: Stable    Consultations This Hospital Stay   ADVANCE DIRECTIVE IP CONSULT  PHARMACY TO DOSE VANCO  ADVANCE DIRECTIVE IP CONSULT    Time Spent on this Encounter   IJavier, personally saw the patient today and spent less than or equal to 30 minutes discharging this patient.    Discharge Orders     Reason for your hospital stay   You were admitted for pneumonia of your right lower lobe.     Follow-up and recommended labs and tests    Follow up with primary care provider, within 7-14 days for hospital follow- up.  No follow up labs or test are needed.     Activity   Your activity upon discharge: activity as tolerated     When to contact your care team   Call your primary doctor if you have any of the following:  increased shortness of breath, persistent fever >101, or develop persistent, watery diarrhea or new abdominal pain within 2 weeks of completing your course of antibiotics.     Full Code     Diet   Follow this diet upon discharge: Regular Diet Adult       Discharge Medications   Current Discharge Medication List      START taking these  medications    Details   albuterol (PROAIR HFA/PROVENTIL HFA/VENTOLIN HFA) 108 (90 Base) MCG/ACT Inhaler Inhale 2 puffs into the lungs every 6 hours as needed for shortness of breath / dyspnea or wheezing  Qty: 1 Inhaler, Refills: 0    Associated Diagnoses: Pneumonia of right lower lobe due to infectious organism (H)      azithromycin (ZITHROMAX) 250 MG tablet Take 1 tablet (250 mg) by mouth daily for 3 days  Qty: 3 tablet, Refills: 0    Associated Diagnoses: Pneumonia of right lower lobe due to infectious organism (H)      cefuroxime (CEFTIN) 500 MG tablet Take 1 tablet (500 mg) by mouth 2 times daily for 5 days  Qty: 10 tablet, Refills: 0    Associated Diagnoses: Pneumonia of right lower lobe due to infectious organism (H)      nicotine (NICODERM CQ) 14 MG/24HR 24 hr patch Place 1 patch onto the skin daily  Qty: 21 patch, Refills: 0    Associated Diagnoses: Tobacco dependence syndrome         CONTINUE these medications which have NOT CHANGED    Details   Acetaminophen (TYLENOL PO) Take 500 mg by mouth      ATORVASTATIN CALCIUM PO Take 40 mg by mouth      lisinopril-hydrochlorothiazide (PRINZIDE/ZESTORETIC) 20-12.5 MG per tablet Take 1 tablet by mouth daily           Allergies   No Known Allergies  Data   Most Recent 3 CBC's:  Recent Labs   Lab Test  07/16/18   0840  07/14/18   2200   WBC  7.7  11.6*   HGB  14.7  14.9   MCV  90  89   PLT  381  339      Most Recent 3 BMP's:  Recent Labs   Lab Test  07/16/18   0840  07/14/18   2200   NA  140  139   POTASSIUM  4.2  3.9   CHLORIDE  106  103   CO2  26  26   BUN  20  24   CR  1.02  1.19   ANIONGAP  8  10   MITZI  8.9  8.9   GLC  101*  122*     Most Recent 2 LFT's:  Recent Labs   Lab Test  07/14/18   2200   AST  30   ALT  53   ALKPHOS  103   BILITOTAL  0.3     Most Recent 6 Bacteria Isolates From Any Culture (See EPIC Reports for Culture Details):  Recent Labs   Lab Test  07/14/18   2215  07/14/18   2200   CULT  Cultured on the 2nd day of incubation:  Gram positive cocci in  clusters  *  Critical Value/Significant Value, preliminary result only, called to and read back by  LATESHA PELAEZ RN ([id number]).  07.16.18 0315 GJS    (Note)  NEGATIVE for the following: Staphylococcus spp., Staph aureus, Staph  epidermidis, Staph lugdunensis, Streptococcus spp., Strep pneumoniae,  Strep pyogenes, Strep agalactiae, Strep anginosus group, Enterococcus  faecalis, Enterococcus faecium, and Listeria spp. by [place] [employer]  multiplex nucleic acid test. Final identification and antimicrobial  susceptibility testing will be verified by standard methods.    Critical Value/Significant Value called to and read back by LATESHA PELAEZ RN @0552 7/16/18      No growth after 1 day       Results for orders placed or performed during the hospital encounter of 07/14/18   XR Chest 2 Views    Narrative    CHEST TWO VIEW   7/14/2018 10:14 PM     HISTORY: Shortness of breath.     COMPARISON: None.      Impression    IMPRESSION: Right lower lobe infiltrate concerning for pneumonia.  Small right pleural effusion. There may also be a small left  infiltrate. No pneumothorax visualized. Cardiac silhouette within  normal limits.    ARIADNA SIEGEL MD

## 2018-07-16 NOTE — PLAN OF CARE
Problem: Patient Care Overview  Goal: Plan of Care/Patient Progress Review  Outcome: No Change  A/Ox4, VSS on r/a ex HTN.  PTA Zestoretic resumed per notes and MAR.  Denies pain. Positive BC's from 7/14 reported a gram positive cocci in clusters.  MD notified.  Andreao started. Sputum culture pending. Independent in room.    Problem: Pneumonia (Adult)  Goal: Signs and Symptoms of Listed Potential Problems Will be Absent, Minimized or Managed (Pneumonia)  Signs and symptoms of listed potential problems will be absent, minimized or managed by discharge/transition of care (reference Pneumonia (Adult) CPG).   Outcome: No Change  Patient reports using IS frequently while awake.  LS improved since yesterday, overnight.  Denies SOB/CASIANO while on r/a.    Problem: Cardiac Disease Comorbidity  Goal: Cardiac Disease  Patient comorbidity will be monitored for signs and symptoms of Cardiac Disease.  Problems will be absent, minimized or managed by discharge/transition of care.   Outcome: No Change  HTN noted.  Parameters not met for PRN medication administration.

## 2018-07-16 NOTE — PROGRESS NOTES
MD Notification    Notified Person: MD    Notified Person Name:  Davy    Notification Date/Time:  7/16/2018  0320    Notification Interaction: phone conversation    Purpose of Notification:  Critical value:   7/14 BC, right arm positive for gram pos cocci in clusters.    Orders Received:  Pharm to dose vanco    Comments:  n/a

## 2018-07-16 NOTE — PLAN OF CARE
Problem: Patient Care Overview  Goal: Plan of Care/Patient Progress Review  Outcome: Adequate for Discharge Date Met: 07/16/18  Patient alert/orient X4, up independently in room/walking hallways.  Lungs clear, on RA.  Tolerating diet, Vss/afebrile/denied pain.  Discharging to home today.

## 2018-07-16 NOTE — CONSULTS
Care Coordinator met with pt and his spouse concerning needing a PCP.  Pt is in the . The [facility] is now asking those that [facility] in [facility] to have physicians outside of their system, per pt's report.  He wanted to establish care thru [facility].  An appointment was scheduled for pt on 7/23/18.  Education on Pneumonia was provided.

## 2018-07-16 NOTE — PROGRESS NOTES
.MD Notification    Notified Person: MD    Notified Person Name: Dr. Celestin    Notification Date/Time: 2020    Notification Interaction: paged    Purpose of Notification: HTN    Orders Received: yes, PRN  hydralazine ordered    Comments:

## 2018-07-16 NOTE — SIGNIFICANT EVENT
GBC in clusters on the blood culture, consider contaminant or real infection  Await speciation on vancomcyin

## 2018-07-17 LAB
BACTERIA SPEC CULT: NORMAL
SPECIMEN SOURCE: NORMAL

## 2018-07-18 LAB
BACTERIA SPEC CULT: ABNORMAL
Lab: ABNORMAL
SPECIMEN SOURCE: ABNORMAL

## 2018-07-21 LAB
BACTERIA SPEC CULT: NO GROWTH
Lab: NORMAL
SPECIMEN SOURCE: NORMAL

## 2018-07-23 ENCOUNTER — OFFICE VISIT (OUTPATIENT)
Dept: FAMILY MEDICINE | Facility: CLINIC | Age: 53
End: 2018-07-23
Payer: OTHER GOVERNMENT

## 2018-07-23 VITALS
DIASTOLIC BLOOD PRESSURE: 76 MMHG | OXYGEN SATURATION: 97 % | TEMPERATURE: 98 F | WEIGHT: 231 LBS | BODY MASS INDEX: 29.65 KG/M2 | SYSTOLIC BLOOD PRESSURE: 120 MMHG | HEART RATE: 72 BPM | HEIGHT: 74 IN

## 2018-07-23 DIAGNOSIS — J18.9 COMMUNITY ACQUIRED PNEUMONIA OF RIGHT LOWER LOBE OF LUNG: Primary | ICD-10-CM

## 2018-07-23 PROCEDURE — 99213 OFFICE O/P EST LOW 20 MIN: CPT | Performed by: INTERNAL MEDICINE

## 2018-07-23 ASSESSMENT — ENCOUNTER SYMPTOMS
ABDOMINAL PAIN: 0
CHILLS: 0
VOMITING: 0
NAUSEA: 0
COUGH: 0
FEVER: 0
SHORTNESS OF BREATH: 0
DIARRHEA: 0

## 2018-07-23 NOTE — PATIENT INSTRUCTIONS
Inform doctor if you develop recurrence of your respiratory symptoms or if you develop diarrhea within the next 2 months.

## 2018-07-23 NOTE — MR AVS SNAPSHOT
"              After Visit Summary   2018    Babak Akbar    MRN: [id number]           Patient Information     Date Of Birth          1965        Visit Information        Provider Department      2018 11:30 AM Mode Arana MD [facility]        Care Instructions    Inform doctor if you develop recurrence of your respiratory symptoms or if you develop diarrhea within the next 2 months.          Follow-ups after your visit        Who to contact     If you have questions or need follow up information about today's clinic visit or your schedule please contact [facility] directly at [phone].  Normal or non-critical lab and imaging results will be communicated to you by MyChart, letter or phone within 4 business days after the clinic has received the results. If you do not hear from us within 7 days, please contact the clinic through MyChart or phone. If you have a critical or abnormal lab result, we will notify you by phone as soon as possible.  Submit refill requests through [email or web address] or call your pharmacy and they will forward the refill request to us. Please allow 3 business days for your refill to be completed.          Additional Information About Your Visit        MyChart Information     [email or web address] lets you send messages to your doctor, view your test results, renew your prescriptions, schedule appointments and more. To sign up, go to [email or web address] . Click on \"Log in\" on the left side of the screen, which will take you to the Welcome page. Then click on \"Sign up Now\" on the right side of the page.     You will be asked to enter the access code listed below, as well as some personal information. Please follow the directions to create your username and password.     Your access code is: [email or web address]  Expires: 10/14/2018 11:01 AM     Your access code will  in 90 days. If you need help or a new code, please call your [facility] or " "[phone].        Care EveryWhere ID     This is your Care EveryWhere ID. This could be used by other organizations to access your [place] medical records  [id number]        Your Vitals Were     Pulse Temperature Height Pulse Oximetry BMI (Body Mass Index)       72 98  F (36.7  C) (Oral) 6' 2\" (1.88 m) 97% 29.66 kg/m2        Blood Pressure from Last 3 Encounters:   07/23/18 120/76   07/16/18 (!) 146/101    Weight from Last 3 Encounters:   07/23/18 231 lb (104.8 kg)   07/15/18 235 lb 7.2 oz (106.8 kg)              Today, you had the following     No orders found for display         Today's Medication Changes          These changes are accurate as of 7/23/18 11:45 AM.  If you have any questions, ask your nurse or doctor.               Stop taking these medicines if you haven't already. Please contact your care team if you have questions.     albuterol 108 (90 Base) MCG/ACT Inhaler   Commonly known as:  PROAIR HFA/PROVENTIL HFA/VENTOLIN HFA   Stopped by:  Mode Arana MD           TYLENOL PO   Stopped by:  Mode Arana MD                    Primary Care Provider Office Phone # Fax #    [facility] [phone] [address]  [facility] [address]        Equal Access to Services     [facility] : Hadii aad ku hadasho Soomaali, waaxda luqadaha, qaybta kaalmada adefanyada, wes klein . So [facility] [phone].    ATENCIÓN: Si habla español, tiene a perez disposición servicios gratuitos de asistencia lingüística. Llame al [phone].    We comply with applicable federal civil rights laws and [place] laws. We do not discriminate on the basis of race, color, national origin, age, disability, sex, sexual orientation, or gender identity.            Thank you!     Thank you for choosing [facility]  for your care. Our goal is always to provide you with excellent care. Hearing back from our patients is one " way we can continue to improve our services. Please take a few minutes to complete the written survey that you may receive in the mail after your visit with us. Thank you!             Your Updated Medication List - Protect others around you: Learn how to safely use, store and throw away your medicines at www.disposemymeds.org.          This list is accurate as of 7/23/18 11:45 AM.  Always use your most recent med list.                   Brand Name Dispense Instructions for use Diagnosis    ATORVASTATIN CALCIUM PO      Take 40 mg by mouth        lisinopril-hydrochlorothiazide 20-12.5 MG per tablet    PRINZIDE/ZESTORETIC     Take 1 tablet by mouth daily        nicotine 14 MG/24HR 24 hr patch    NICODERM CQ    21 patch    Place 1 patch onto the skin daily    Tobacco dependence syndrome

## 2018-07-23 NOTE — PROGRESS NOTES
HPI    SUBJECTIVE:   Babak Akbar is a 53 year old male who presents to clinic today for the following health issues:      Hospital Follow-up Visit:    Hospital/Nursing Home/IP Rehab Facility: [facility] [place]  Date of Admission: 7/14/18  Date of Discharge: 7/16/18  Reason(s) for Admission: Community acquired pneumonia of right lower lobe with early sepsis            Problems taking medications regularly:  None       Medication changes since discharge: albuterol hfa, z-pack, Ceftin 500 mg BID(5d), nicotine patch       Problems adhering to non-medication therapy:  None  \  Summary of hospitalization:  [facility] discharge summary reviewed  Diagnostic Tests/Treatments reviewed.  Follow up needed: none  Other Healthcare Providers Involved in Patient s Care:         None  Update since discharge: improved.     Post Discharge Medication Reconciliation: discharge medications reconciled, continue medications without change.  Plan of care communicated with patient     Coding guidelines for this visit:  Type of Medical   Decision Making Face-to-Face Visit       within 7 Days of discharge Face-to-Face Visit        within 14 days of discharge   Moderate Complexity [id number]   High Complexity [id number]            Since the patient's discharge from the hospital, he has been feeling much better. He has not had any coughing episodes today so far. Denies fever/chills, malaise, shortness of breath, diarrhea. He completed his antibiotic treatment yesterday and did not expense any side effects. He will be on  leave for the next month so he does not anticipate any strenuous activities in the short-term.    Scheduled for his full physical at the [facility] clinic next month.    No other subjective complaints presented.      Past Medical History:   Diagnosis Date     Hypertension      Tobacco abuse    [facility]: [facility] [place], polyp (non-cancerous).      Review of Systems   Constitutional:  "Negative for chills, fever and malaise/fatigue.   Respiratory: Negative for cough and shortness of breath.    Cardiovascular: Negative for chest pain and leg swelling.   Gastrointestinal: Negative for abdominal pain, diarrhea, nausea and vomiting.   Skin: Negative for rash.       /76 (BP Location: Left arm, Cuff Size: Adult Large)  Pulse 72  Temp 98  F (36.7  C) (Oral)  Ht 6' 2\" (1.88 m)  Wt 231 lb (104.8 kg)  SpO2 97%  BMI 29.66 kg/m2      Physical Exam   Constitutional: He is oriented to person, place, and time. No distress.   Cardiovascular: Normal rate, regular rhythm and normal heart sounds.    Pulmonary/Chest: Effort normal and breath sounds normal. No respiratory distress.   Abdominal: Soft. There is no tenderness.   Musculoskeletal: He exhibits no edema.   Lymphadenopathy:     He has no cervical adenopathy.   Neurological: He is alert and oriented to person, place, and time. GCS score is 15.   Skin: No rash noted.   Vitals reviewed.        ICD-10-CM    1. Community acquired pneumonia of right lower lobe of lung (H) J18.1  completed antibiotic treatment        Patient Instructions   Inform doctor if you develop recurrence of your respiratory symptoms or if you develop diarrhea within the next 2 months.      "

## 2018-09-14 ENCOUNTER — OFFICE VISIT (OUTPATIENT)
Dept: FAMILY MEDICINE | Facility: CLINIC | Age: 53
End: 2018-09-14
Payer: OTHER GOVERNMENT

## 2018-09-14 VITALS
HEART RATE: 63 BPM | BODY MASS INDEX: 29.95 KG/M2 | SYSTOLIC BLOOD PRESSURE: 119 MMHG | OXYGEN SATURATION: 97 % | HEIGHT: 74 IN | WEIGHT: 233.4 LBS | DIASTOLIC BLOOD PRESSURE: 76 MMHG

## 2018-09-14 DIAGNOSIS — L30.9 DERMATITIS: Primary | ICD-10-CM

## 2018-09-14 PROCEDURE — 99213 OFFICE O/P EST LOW 20 MIN: CPT | Performed by: INTERNAL MEDICINE

## 2018-09-14 RX ORDER — TRIAMCINOLONE ACETONIDE 1 MG/G
CREAM TOPICAL
Qty: 15 G | Refills: 1 | Status: SHIPPED | OUTPATIENT
Start: 2018-09-14

## 2018-09-14 RX ORDER — METHYLPREDNISOLONE 4 MG
TABLET, DOSE PACK ORAL
Qty: 21 TABLET | Refills: 0 | Status: SHIPPED | OUTPATIENT
Start: 2018-09-14

## 2018-09-14 ASSESSMENT — ENCOUNTER SYMPTOMS
SENSORY CHANGE: 0
MYALGIAS: 0
ABDOMINAL PAIN: 0
TINGLING: 0
SORE THROAT: 0
CHILLS: 0
EYE REDNESS: 0
FEVER: 0
SHORTNESS OF BREATH: 0

## 2018-09-14 NOTE — PROGRESS NOTES
"HPI      SUBJECTIVE:   Babak Akbar is a 53 year old male who presents to clinic today for the following health issues:      Rash  Onset: x 6 weeks     Description:   Location: Bilateral sides of the abdomen  Character: round, blotchy, raised, red  Itching (Pruritis): YES    Progression of Symptoms:  improving    Accompanying Signs & Symptoms:  Fever: no   Body aches or joint pain: no   Sore throat symptoms: no   Recent cold symptoms: no     History:   Previous similar rash: no    Precipitating factors:   Exposure to similar rash: no   New exposures: none   Recent travel: no     Alleviating factors: none    Therapies Tried and outcome: None       As above.  No new detergents or skin products. No exposure to chemicals or outdoor elements. Does not wear a body harness.      Past Medical History:   Diagnosis Date     Hypertension      Tobacco abuse        Review of Systems   Constitutional: Negative for chills, fever and malaise/fatigue.   HENT: Negative for congestion and sore throat.    Eyes: Negative for redness.   Respiratory: Negative for shortness of breath.    Gastrointestinal: Negative for abdominal pain.   Musculoskeletal: Negative for joint pain and myalgias.   Skin: Positive for itching and rash.   Neurological: Negative for tingling and sensory change.       /76 (BP Location: Right arm, Patient Position: Chair, Cuff Size: Adult Regular)  Pulse 63  Ht 6' 2\" (1.88 m)  Wt 233 lb 6.4 oz (105.9 kg)  SpO2 97%  BMI 29.97 kg/m2      Physical Exam   Constitutional: He is oriented to person, place, and time. No distress.   Eyes: Conjunctivae are normal.   Pulmonary/Chest: Effort normal and breath sounds normal. No respiratory distress.   Abdominal: Soft. There is no tenderness.   Neurological: He is alert and oriented to person, place, and time. GCS score is 15.   Skin: Rash (patch of erythematous maculopapular rashes on each side of the abdomen more towards the lateral aspects) noted.      "   Vitals reviewed.        ICD-10-CM    1. Dermatitis L30.9 methylPREDNISolone (MEDROL DOSEPAK) 4 MG tablet     triamcinolone (KENALOG) 0.1 % cream     DERMATOLOGY REFERRAL       Patient Instructions   Take prescribed oral medication as directed.    May use prescribed cream for mild flare-ups of the rash.    Consult with Dermatology if the rash persists/worsens.

## 2018-09-14 NOTE — PATIENT INSTRUCTIONS
Take prescribed oral medication as directed.    May use prescribed cream for mild flare-ups of the rash.    Consult with Dermatology if the rash persists/worsens.

## 2018-09-14 NOTE — MR AVS SNAPSHOT
After Visit Summary   9/14/2018    Babak Akbar    MRN: [id number]           Patient Information     Date Of Birth          1965        Visit Information        Provider Department      9/14/2018 1:30 PM Mode Arana MD [facility]        Today's Diagnoses     Dermatitis    -  1      Care Instructions    Take prescribed oral medication as directed.    May use prescribed cream for mild flare-ups of the rash.    Consult with Dermatology if the rash persists/worsens.          Follow-ups after your visit        Additional Services     DERMATOLOGY REFERRAL       Your provider has referred you to: [facility]: [facility] STEPHANIE Dee [phone]   [email or web address]    Please be aware that coverage of these services is subject to the terms and limitations of your health insurance plan.  Call member services at your health plan with any benefit or coverage questions.      Please bring the following with you to your appointment:    (1) Any X-Rays, CTs or MRIs which have been performed.  Contact the facility where they were done to arrange for  prior to your scheduled appointment.    (2) List of current medications  (3) This referral request   (4) Any documents/labs given to you for this referral                  Who to contact     If you have questions or need follow up information about today's clinic visit or your schedule please contact [facility] directly at [phone].  Normal or non-critical lab and imaging results will be communicated to you by MyChart, letter or phone within 4 business days after the clinic has received the results. If you do not hear from us within 7 days, please contact the clinic through MyChart or phone. If you have a critical or abnormal lab result, we will notify you by phone as soon as possible.  Submit refill requests through [employer] or call your pharmacy and they will forward the refill request  "to us. Please allow 3 business days for your refill to be completed.          Additional Information About Your Visit        [employer] Information     [employer] gives you secure access to your electronic health record. If you see a primary care provider, you can also send messages to your care team and make appointments. If you have questions, please call your primary care clinic.  If you do not have a primary care provider, please call [phone] and they will assist you.        Care EveryWhere ID     This is your Care EveryWhere ID. This could be used by other organizations to access your [place] medical records  [id number]        Your Vitals Were     Pulse Height Pulse Oximetry BMI (Body Mass Index)          63 6' 2\" (1.88 m) 97% 29.97 kg/m2         Blood Pressure from Last 3 Encounters:   09/14/18 119/76   07/23/18 120/76   07/16/18 (!) 146/101    Weight from Last 3 Encounters:   09/14/18 233 lb 6.4 oz (105.9 kg)   07/23/18 231 lb (104.8 kg)   07/15/18 235 lb 7.2 oz (106.8 kg)              We Performed the Following     DERMATOLOGY REFERRAL          Today's Medication Changes          These changes are accurate as of 9/14/18  1:34 PM.  If you have any questions, ask your nurse or doctor.               Start taking these medicines.        Dose/Directions    methylPREDNISolone 4 MG tablet   Commonly known as:  MEDROL DOSEPAK   Used for:  Dermatitis   Started by:  Mode Arana MD        Follow package instructions   Quantity:  21 tablet   Refills:  0       triamcinolone 0.1 % cream   Commonly known as:  KENALOG   Used for:  Dermatitis   Started by:  Mode Arana MD        Apply thinly to affected area 2 times daily for no more than 14 days.   Quantity:  15 g   Refills:  1            Where to get your medicines      These medications were sent to [facility] [place] - [address], [address]  [address], [address], [facility] [address]     " Phone:  [phone]     methylPREDNISolone 4 MG tablet    triamcinolone 0.1 % cream                Primary Care Provider Office Phone # Fax #    Mode Irving Arana [phone] [address] [address]        Equal Access to Services     [facility] : Hadii aad ku hadasho Soomaali, waaxda luqadaha, qaybta kaalmada adeegyada, waxay idiin hayaan adeeg khkarysh la'aan . So [facility] [phone].    ATENCIÓN: Si habla español, tiene a perez disposición servicios gratuitos de asistencia lingüística. Llame al [phone].    We comply with applicable federal civil rights laws and [place] laws. We do not discriminate on the basis of race, color, national origin, age, disability, sex, sexual orientation, or gender identity.            Thank you!     Thank you for choosing [facility]  for your care. Our goal is always to provide you with excellent care. Hearing back from our patients is one way we can continue to improve our services. Please take a few minutes to complete the written survey that you may receive in the mail after your visit with us. Thank you!             Your Updated Medication List - Protect others around you: Learn how to safely use, store and throw away your medicines at www.disposemymeds.org.          This list is accurate as of 9/14/18  1:34 PM.  Always use your most recent med list.                   Brand Name Dispense Instructions for use Diagnosis    ATORVASTATIN CALCIUM PO      Take 40 mg by mouth        lisinopril-hydrochlorothiazide 20-12.5 MG per tablet    PRINZIDE/ZESTORETIC     Take 1 tablet by mouth daily        methylPREDNISolone 4 MG tablet    MEDROL DOSEPAK    21 tablet    Follow package instructions    Dermatitis       triamcinolone 0.1 % cream    KENALOG    15 g    Apply thinly to affected area 2 times daily for no more than 14 days.    Dermatitis

## 2020-03-11 ENCOUNTER — HEALTH MAINTENANCE LETTER (OUTPATIENT)
Age: 55
End: 2020-03-11

## 2021-01-03 ENCOUNTER — HEALTH MAINTENANCE LETTER (OUTPATIENT)
Age: 56
End: 2021-01-03

## 2021-04-25 ENCOUNTER — HEALTH MAINTENANCE LETTER (OUTPATIENT)
Age: 56
End: 2021-04-25

## 2021-10-10 ENCOUNTER — HEALTH MAINTENANCE LETTER (OUTPATIENT)
Age: 56
End: 2021-10-10

## 2022-05-21 ENCOUNTER — HEALTH MAINTENANCE LETTER (OUTPATIENT)
Age: 57
End: 2022-05-21

## 2022-09-18 ENCOUNTER — HEALTH MAINTENANCE LETTER (OUTPATIENT)
Age: 57
End: 2022-09-18

## 2023-06-04 ENCOUNTER — HEALTH MAINTENANCE LETTER (OUTPATIENT)
Age: 58
End: 2023-06-04